# Patient Record
Sex: FEMALE | Race: WHITE
[De-identification: names, ages, dates, MRNs, and addresses within clinical notes are randomized per-mention and may not be internally consistent; named-entity substitution may affect disease eponyms.]

---

## 2019-12-06 ENCOUNTER — HOSPITAL ENCOUNTER (OUTPATIENT)
Dept: HOSPITAL 92 - SCSMRI | Age: 54
Discharge: HOME | End: 2019-12-06
Attending: INTERNAL MEDICINE
Payer: COMMERCIAL

## 2019-12-06 DIAGNOSIS — R10.9: Primary | ICD-10-CM

## 2019-12-06 DIAGNOSIS — R94.5: ICD-10-CM

## 2019-12-06 DIAGNOSIS — D18.03: ICD-10-CM

## 2019-12-06 PROCEDURE — 74183 MRI ABD W/O CNTR FLWD CNTR: CPT

## 2019-12-06 NOTE — MRI
EXAM: MRI of the abdomen without and with contrast



COMPARISON: None



HISTORY: Abnormal LFTs and right-sided abdominal pain



TECHNIQUE: Multiplanar multi sequence MR images were taken of the abdomen without and with IV contras
t. [An MRCP was performed.]



FINDINGS:

Liver: No intrahepatic ductal dilatation. Small 1.2 cm focus of high T2 signal in the left lobe of th
e liver demonstrates peripheral puddling and centripetal filling consistent with a small

hemangioma. Normal signal without dropout on out of phase images.  No abnormal enhancement.



Gallbladder: A small 2 to 3 mm filling defect in the dependent aspect of the gallbladder may resent a
 small gallstone.



Common bile duct: Normal caliber without filling defects



Adrenal glands: Unremarkable.



Kidneys: No hydronephrosis or focal renal lesions. No abnormal areas of enhancement.



Spleen: Unremarkable.



Pancreas: Unremarkable. No abnormal enhancement.



Retroperitoneum: No enlarged lymph nodes



Bones: No marrow signal abnormality.



IMPRESSION:

1. Possible small gallstone within the gallbladder.

2. Hepatic hemangioma



Reported By: Christian Marrero 

Electronically Signed:  12/6/2019 2:08 PM

## 2020-02-28 ENCOUNTER — HOSPITAL ENCOUNTER (INPATIENT)
Dept: HOSPITAL 92 - ERS | Age: 55
LOS: 3 days | Discharge: HOME | DRG: 417 | End: 2020-03-02
Attending: SURGERY | Admitting: SURGERY
Payer: COMMERCIAL

## 2020-02-28 VITALS — BODY MASS INDEX: 25.8 KG/M2

## 2020-02-28 DIAGNOSIS — E03.9: ICD-10-CM

## 2020-02-28 DIAGNOSIS — E78.5: ICD-10-CM

## 2020-02-28 DIAGNOSIS — K75.4: Primary | ICD-10-CM

## 2020-02-28 DIAGNOSIS — M06.9: ICD-10-CM

## 2020-02-28 DIAGNOSIS — K85.10: ICD-10-CM

## 2020-02-28 DIAGNOSIS — K80.10: ICD-10-CM

## 2020-02-28 DIAGNOSIS — K74.60: ICD-10-CM

## 2020-02-28 DIAGNOSIS — Z79.899: ICD-10-CM

## 2020-02-28 DIAGNOSIS — E66.9: ICD-10-CM

## 2020-02-28 DIAGNOSIS — Z79.890: ICD-10-CM

## 2020-02-28 DIAGNOSIS — Z90.710: ICD-10-CM

## 2020-02-28 LAB
ALBUMIN SERPL BCG-MCNC: 3.6 G/DL (ref 3.5–5)
ALP SERPL-CCNC: 465 U/L (ref 40–110)
ALT SERPL W P-5'-P-CCNC: 1407 U/L (ref 8–55)
ANION GAP SERPL CALC-SCNC: 12 MMOL/L (ref 10–20)
AST SERPL-CCNC: 1678 U/L (ref 5–34)
BASOPHILS # BLD AUTO: 0.1 THOU/UL (ref 0–0.2)
BASOPHILS NFR BLD AUTO: 1.4 % (ref 0–1)
BILIRUB SERPL-MCNC: 1.9 MG/DL (ref 0.2–1.2)
BUN SERPL-MCNC: 8 MG/DL (ref 9.8–20.1)
CALCIUM SERPL-MCNC: 9.1 MG/DL (ref 7.8–10.44)
CHLORIDE SERPL-SCNC: 108 MMOL/L (ref 98–107)
CO2 SERPL-SCNC: 22 MMOL/L (ref 22–29)
CREAT CL PREDICTED SERPL C-G-VRATE: 0 ML/MIN (ref 70–130)
EOSINOPHIL # BLD AUTO: 0.2 THOU/UL (ref 0–0.7)
EOSINOPHIL NFR BLD AUTO: 2.1 % (ref 0–10)
GLOBULIN SER CALC-MCNC: 4.4 G/DL (ref 2.4–3.5)
GLUCOSE SERPL-MCNC: 94 MG/DL (ref 70–105)
HGB BLD-MCNC: 13.3 G/DL (ref 12–16)
LEUKOCYTE ESTERASE UR QL STRIP.AUTO: 250 LEU/UL
LIPASE SERPL-CCNC: 750 U/L (ref 8–78)
LYMPHOCYTES # BLD: 2.3 THOU/UL (ref 1.2–3.4)
LYMPHOCYTES NFR BLD AUTO: 27.5 % (ref 21–51)
MCH RBC QN AUTO: 33 PG (ref 27–31)
MCV RBC AUTO: 98.1 FL (ref 78–98)
MONOCYTES # BLD AUTO: 1 THOU/UL (ref 0.11–0.59)
MONOCYTES NFR BLD AUTO: 12.3 % (ref 0–10)
NEUTROPHILS # BLD AUTO: 4.7 THOU/UL (ref 1.4–6.5)
NEUTROPHILS NFR BLD AUTO: 56.7 % (ref 42–75)
PLATELET # BLD AUTO: 200 THOU/UL (ref 130–400)
POTASSIUM SERPL-SCNC: 3.8 MMOL/L (ref 3.5–5.1)
PROT UR STRIP.AUTO-MCNC: 20 MG/DL
RBC # BLD AUTO: 4.02 MILL/UL (ref 4.2–5.4)
RBC UR QL AUTO: (no result) HPF (ref 0–3)
SODIUM SERPL-SCNC: 138 MMOL/L (ref 136–145)
WBC # BLD AUTO: 8.2 THOU/UL (ref 4.8–10.8)
WBC UR QL AUTO: (no result) HPF (ref 0–3)

## 2020-02-28 PROCEDURE — 74330 X-RAY BILE/PANC ENDOSCOPY: CPT

## 2020-02-28 PROCEDURE — 88307 TISSUE EXAM BY PATHOLOGIST: CPT

## 2020-02-28 PROCEDURE — 36415 COLL VENOUS BLD VENIPUNCTURE: CPT

## 2020-02-28 PROCEDURE — 81015 MICROSCOPIC EXAM OF URINE: CPT

## 2020-02-28 PROCEDURE — 83690 ASSAY OF LIPASE: CPT

## 2020-02-28 PROCEDURE — C9113 INJ PANTOPRAZOLE SODIUM, VIA: HCPCS

## 2020-02-28 PROCEDURE — 80076 HEPATIC FUNCTION PANEL: CPT

## 2020-02-28 PROCEDURE — 81003 URINALYSIS AUTO W/O SCOPE: CPT

## 2020-02-28 PROCEDURE — 96374 THER/PROPH/DIAG INJ IV PUSH: CPT

## 2020-02-28 PROCEDURE — 96361 HYDRATE IV INFUSION ADD-ON: CPT

## 2020-02-28 PROCEDURE — 96375 TX/PRO/DX INJ NEW DRUG ADDON: CPT

## 2020-02-28 PROCEDURE — 85025 COMPLETE CBC W/AUTO DIFF WBC: CPT

## 2020-02-28 PROCEDURE — 88313 SPECIAL STAINS GROUP 2: CPT

## 2020-02-28 PROCEDURE — 76705 ECHO EXAM OF ABDOMEN: CPT

## 2020-02-28 PROCEDURE — 88304 TISSUE EXAM BY PATHOLOGIST: CPT

## 2020-02-28 PROCEDURE — 80053 COMPREHEN METABOLIC PANEL: CPT

## 2020-02-28 PROCEDURE — S0020 INJECTION, BUPIVICAINE HYDRO: HCPCS

## 2020-02-28 PROCEDURE — 80048 BASIC METABOLIC PNL TOTAL CA: CPT

## 2020-02-28 PROCEDURE — 82150 ASSAY OF AMYLASE: CPT

## 2020-02-28 NOTE — ULT
EXAM:

Right upper quadrant ultrasound



PROVIDED CLINICAL HISTORY:

Abdominal pain



COMPARISON:

None



FINDINGS:

Visualized portions of the pancreas appear normal.



Liver demonstrates no mass or intrahepatic biliary ductal dilatation.



Common duct is nondilated. Gallbladder demonstrates gallstones, appearing decompressed. 



Right kidney demonstrates no hydronephrosis or mass.



IMPRESSION:

Decompressed gallbladder. Gallstones.



Reported By: Alvarez Aceves 

Electronically Signed:  2/28/2020 6:04 PM

## 2020-02-29 LAB
ALBUMIN SERPL BCG-MCNC: 3.1 G/DL (ref 3.5–5)
ALP SERPL-CCNC: 390 U/L (ref 40–110)
ALT SERPL W P-5'-P-CCNC: 1292 U/L (ref 8–55)
ANION GAP SERPL CALC-SCNC: 12 MMOL/L (ref 10–20)
AST SERPL-CCNC: 1751 U/L (ref 5–34)
BASOPHILS # BLD AUTO: 0.1 THOU/UL (ref 0–0.2)
BASOPHILS NFR BLD AUTO: 1.3 % (ref 0–1)
BILIRUB DIRECT SERPL-MCNC: 1.5 MG/DL (ref 0.1–0.3)
BILIRUB SERPL-MCNC: 2.2 MG/DL (ref 0.2–1.2)
BUN SERPL-MCNC: 9 MG/DL (ref 9.8–20.1)
CALCIUM SERPL-MCNC: 8.7 MG/DL (ref 7.8–10.44)
CHLORIDE SERPL-SCNC: 107 MMOL/L (ref 98–107)
CO2 SERPL-SCNC: 22 MMOL/L (ref 22–29)
CREAT CL PREDICTED SERPL C-G-VRATE: 107 ML/MIN (ref 70–130)
EOSINOPHIL # BLD AUTO: 0.2 THOU/UL (ref 0–0.7)
EOSINOPHIL NFR BLD AUTO: 2.1 % (ref 0–10)
GLUCOSE SERPL-MCNC: 75 MG/DL (ref 70–105)
HGB BLD-MCNC: 12.2 G/DL (ref 12–16)
LIPASE SERPL-CCNC: 346 U/L (ref 8–78)
LYMPHOCYTES # BLD: 1.8 THOU/UL (ref 1.2–3.4)
LYMPHOCYTES NFR BLD AUTO: 23.2 % (ref 21–51)
MCH RBC QN AUTO: 32.8 PG (ref 27–31)
MCV RBC AUTO: 98.6 FL (ref 78–98)
MONOCYTES # BLD AUTO: 1 THOU/UL (ref 0.11–0.59)
MONOCYTES NFR BLD AUTO: 12.6 % (ref 0–10)
NEUTROPHILS # BLD AUTO: 4.8 THOU/UL (ref 1.4–6.5)
NEUTROPHILS NFR BLD AUTO: 60.7 % (ref 42–75)
PLATELET # BLD AUTO: 183 THOU/UL (ref 130–400)
POTASSIUM SERPL-SCNC: 4.3 MMOL/L (ref 3.5–5.1)
RBC # BLD AUTO: 3.72 MILL/UL (ref 4.2–5.4)
SODIUM SERPL-SCNC: 137 MMOL/L (ref 136–145)
WBC # BLD AUTO: 7.9 THOU/UL (ref 4.8–10.8)

## 2020-02-29 PROCEDURE — 0F798ZZ DILATION OF COMMON BILE DUCT, VIA NATURAL OR ARTIFICIAL OPENING ENDOSCOPIC: ICD-10-PCS | Performed by: INTERNAL MEDICINE

## 2020-02-29 PROCEDURE — BF10YZZ FLUOROSCOPY OF BILE DUCTS USING OTHER CONTRAST: ICD-10-PCS | Performed by: INTERNAL MEDICINE

## 2020-02-29 RX ADMIN — Medication SCH: at 19:20

## 2020-02-29 RX ADMIN — POTASSIUM CHLORIDE, DEXTROSE MONOHYDRATE AND SODIUM CHLORIDE SCH MLS: 150; 5; 450 INJECTION, SOLUTION INTRAVENOUS at 13:52

## 2020-02-29 RX ADMIN — POTASSIUM CHLORIDE, DEXTROSE MONOHYDRATE AND SODIUM CHLORIDE SCH MLS: 150; 5; 450 INJECTION, SOLUTION INTRAVENOUS at 18:39

## 2020-02-29 NOTE — HP
CHIEF COMPLAINT:  Mid epigastric and right upper quadrant pain, nausea.



HISTORY OF PRESENT ILLNESS:  This is a 54-year-old female, who developed upper

abdominal pain and nausea in October, so they did an ultrasound that showed a

probable polyp in the gallbladder, but her LFTs were bumped.  So, they did an MRCP,

it showed a small filling defect in the dependent gallbladder, possible stone.  She

was doing fine until yesterday when she developed recurrence of the pain, but it was

worse.  She went to the GI clinic, where they repeated the LFTs and they were

bumped, so she was sent to the ER.  An ultrasound was performed that showed this

time definite gallstones. 



PAST MEDICAL HISTORY:  Obesity, hyperlipidemia, and hypothyroidism.



PAST SURGICAL HISTORY:  She had a hysterectomy 10 years ago and arm surgery.



MEDICATIONS:  

1. Atorvastatin.

2. Levothyroxine.



ALLERGIES:  NO KNOWN DRUG ALLERGIES.



SOCIAL HISTORY:  She is , unemployed.  No tobacco or alcohol.



FAMILY HISTORY:  Noncontributory.



PHYSICAL EXAMINATION:

VITAL SIGNS:  Temperature 98.4, pulse 74, and blood pressure 106/67. 

GENERAL:  She is awake and alert, in no apparent distress.  She says her pain is

better. 

HEENT:  No jaundice. 

LUNGS:  Clear. 

HEART:  Regular rate and rhythm. 

ABDOMEN:  Obese, soft, mildly tender in the right upper quadrant.  No palpable mass. 

EXTREMITIES:  Unremarkable.



LABORATORY DATA:  Her white count is 8.2, H and H are 13 and 39, and platelet count

200.  Her electrolytes are fine, but her bilirubin is 1.9, AST of 1678, ALT of 1407,

alkaline phosphatase of 465, and lipase is 750. 



ASSESSMENT:  Possible biliary pancreatitis, but she is improved.



PLAN:  GI consult.  We will repeat their LFTs.  If they are getting better, would

recommend lap khanh with cholangiogram.  If they are worse, I would recommend ERCP. 







Job ID:  [id number]

## 2020-02-29 NOTE — CON
DATE OF CONSULTATION:  02/29/2020



CHIEF COMPLAINT:  Abdominal pain and nausea.



HISTORY OF PRESENT ILLNESS:  Ms. Delgado is a 54-year-old woman who developed nausea

and a dull aching right upper quadrant constant abdominal pain back in October 2019.

 Her liver tests were completely normal in October 2018; however, with the onset of

this abdominal pain back in October 2019, her ALT was up in the 1400 range with an

AST of 958 and alkaline phosphatase of 288 and a normal bilirubin.  She saw Dr. Alvarez

in GI Clinic.  MRCP was obtained on 12/06/2019.  The MRCP showed a hemangioma in the

liver.  A small 2 to 3 mm gallstone was noted in the gallbladder.  The common bile

duct was nondilated without obvious filling defects.  Her liver tests when tested in

the office in October had dropped down to normal alkaline phosphatase with an AST of

53 and ALT of 62 and a bilirubin of 0.3.  Her pain then went away, she had no

further nausea, no pain for about 2 months after that and then a week ago, she had

recurrence of the pain.  At this time, the pain was more severe than with previous

episodes.  She came into the office yesterday and was sent onto the emergency room

for further care.  Her labs were drawn and at this time, her AST was 1700, her ALT

was 1400, and her alkaline phosphatase was 466 with an elevated bilirubin at 2.0.

She had an ultrasound performed in the emergency room that again showed gallstones

in the gallbladder, but a nondilated bile duct.  She has also had bloating and

aching epigastric abdominal pain and her lipase was noted to be elevated at 750 with

an upper limit of normal at this lab of 78.  She was given IV fluids and her pain is

doing better today.  She still has some nausea and discomfort.  She has had no

diarrhea, constipation, or blood in the stool. 



PAST MEDICAL HISTORY:  Includes rheumatoid arthritis.  She was treated with the

Remicade in the past, but has not been on it for least 12 years.  She has

hyperlipidemia, has been on atorvastatin for that.  She has been on the same

medicine for at least a year and a half without any recent changes.  She also has

hypothyroidism. 



PAST SURGICAL HISTORY:  Hysterectomy and arm surgery.



FAMILY HISTORY:  Negative for GI malignancy.



SOCIAL HISTORY:  No alcohol, tobacco, or drugs.



ALLERGIES:  NO KNOWN DRUG ALLERGIES.



MEDICATIONS:  At home prior to admission include:

1. Atorvastatin.

2. Levothyroxine. 

She has not taken any acetaminophen or NSAIDs.



REVIEW OF SYSTEMS:  Negative x10 systems reviewed, except as stated in history of

present illness.  No fever. 



PHYSICAL EXAMINATION:

VITAL SIGNS:  Temperature 98.4, pulse 74, and blood pressure 106/67. 

GENERAL:  She is in no acute distress.  Alert and oriented x3. 

HEENT:  Eyes have no scleral icterus.  Oropharynx is clear without lesions.  No

cervical or supraclavicular lymphadenopathy. 

LUNGS:  Clear to auscultation bilaterally. 

HEART:  Regular rate and rhythm without murmur. 

ABDOMEN:  Soft.  Mild tenderness in the right upper quadrant without guarding.

Bowel sounds are present. 

EXTREMITIES:  No lower extremity edema. 

NEUROLOGIC:  Cranial nerves are grossly intact.



LABORATORY DATA:  White blood cell count 7.9, hemoglobin 12.2, platelets 183.

Creatinine 0.72, calcium 8.7.  Bilirubin 2.2, AST 1751, ALT 1292, alkaline

phosphatase 390, lipase is 346 down from 750 yesterday.  She did have a smooth

muscle antibody that was moderately elevated at 34 on 10/17/2019.  Viral hepatitis

screen was negative at that time.  Her globulin yesterday was elevated at 4.4.

Today, her albumin is 7.0 with a total protein of 3.1 with a globulin of 3.9. 



IMPRESSION:  

1. Gallstone pancreatitis.  She has small stones measuring 2 to 3 mm in the

gallbladder along with acute pancreatitis and acute elevation of her liver tests.

Her bile duct is not dilated, however only small stones were seen.  This would

explain both the elevated liver tests and the acute pancreatitis.  There is no

alcohol history. 

2. Abnormal liver function tests.  The marked elevation of the transaminases would

be more consistent with an alternative etiology.  Autoimmune hepatitis could explain

this.  Her smooth muscle antibody was elevated in October.  Her globulin was

elevated.  She has a history of rheumatoid arthritis, which has been untreated.

Liver biopsy would be helpful in confirming this diagnosis and initiating treatment

if indicated.  If she ultimately requires a cholecystectomy for gallstones, then

liver biopsy could be performed at that time.  One factor that might be more

suggestive of a biliary stone than the autoimmune hepatitis might be that her

transaminases abruptly dropped from 1000 to 50s and 60s range with temporary

resolution of her abdominal pain.  Autoimmune hepatitis I would expect to be more

persistent.  With the bilirubin increasing, I think the index of suspicion is high

enough for stone along with the pancreatitis that we should proceed with ERCP at

this point.  Repeat MRI might miss a 2 mm stone as was noted on previous imaging. 



RECOMMENDATIONS:  

1. ERCP with sphincterotomy today.

2. If she does ultimately require cholecystectomy following that, then liver biopsy

could be done at the same time. 

3. I discussed the risks and benefits of ERCP in detail with Ms. Delgado.  This

includes bleeding, infection, perforation, pancreatitis, and failure to cannulate. 







Job ID:  [id number]

## 2020-02-29 NOTE — OP
DATE OF PROCEDURE:  02/29/2020



PROCEDURE PERFORMED:  Endoscopic retrograde cholangiopancreatography with

sphincterotomy. 



PREOPERATIVE DIAGNOSES:  Gallstone pancreatitis and increasing bilirubin.



DESCRIPTION OF PROCEDURE:  Informed consent was obtained from the patient.  She 
was

sedated with general anesthesia and placed in the prone position.  The 
duodenoscope

was advanced easily to the second portion of the duodenum, where the ampulla was

identified and appeared unremarkable.  The common bile duct was selectively

cannulated easily on first attempt with the guidewire.  The sphincterotome was

advanced into the bile duct and cholangiogram revealed a 3 mm smooth common bile

duct without filling defects.  The intra and extrahepatic ducts were normal.  
The

gallbladder filled and cholelithiasis was seen.  A complete sphincterotomy was

performed.  Balloon sweep of the bile duct with a partially filled 9 mm balloon 
was

performed.  The bile duct was confirmed to be clear.  Occlusion cholangiogram 
was

clear.  The balloon passed easily through the sphincterotomy without any 
resistance,

and fluid was suctioned from the stomach, and the procedure was completed. 



IMPRESSION:  

1. Cholangiogram shows a 3 mm common bile duct without obvious filling defects.

There were normal intra and extrahepatic ducts.  The gallbladder did fill 
showing

cholelithiasis. 

2. Complete sphincterotomy performed with good drainage of clear yellow bile.

3. Balloon sweep of the bile duct and occlusion cholangiogram confirmed the 
duct to

be clear.  The balloon passed easily through the sphincterotomy without any

resistance. 



RECOMMENDATIONS:  

1. Recheck liver tests in the morning.

2. Plan is for laparoscopic cholecystectomy and liver biopsy tomorrow.

3. I suspect she has autoimmune hepatitis is a primary reason for the elevated 
liver

tests.  This could be in addition to gallstone pancreatitis, however. 





Job ID:  [id number]



[facility]

## 2020-03-01 LAB
ALBUMIN SERPL BCG-MCNC: 3 G/DL (ref 3.5–5)
ALP SERPL-CCNC: 364 U/L (ref 40–110)
ALT SERPL W P-5'-P-CCNC: 1091 U/L (ref 8–55)
AST SERPL-CCNC: 1163 U/L (ref 5–34)
BASOPHILS # BLD AUTO: 0 THOU/UL (ref 0–0.2)
BASOPHILS NFR BLD AUTO: 0.1 % (ref 0–1)
BILIRUB DIRECT SERPL-MCNC: 0.9 MG/DL (ref 0.1–0.3)
BILIRUB SERPL-MCNC: 1.3 MG/DL (ref 0.2–1.2)
EOSINOPHIL # BLD AUTO: 0 THOU/UL (ref 0–0.7)
EOSINOPHIL NFR BLD AUTO: 0.1 % (ref 0–10)
HGB BLD-MCNC: 11.4 G/DL (ref 12–16)
LIPASE SERPL-CCNC: 99 U/L (ref 8–78)
LYMPHOCYTES # BLD: 1.3 THOU/UL (ref 1.2–3.4)
LYMPHOCYTES NFR BLD AUTO: 15.6 % (ref 21–51)
MCH RBC QN AUTO: 32.5 PG (ref 27–31)
MCV RBC AUTO: 98.2 FL (ref 78–98)
MONOCYTES # BLD AUTO: 0.5 THOU/UL (ref 0.11–0.59)
MONOCYTES NFR BLD AUTO: 5.8 % (ref 0–10)
NEUTROPHILS # BLD AUTO: 6.3 THOU/UL (ref 1.4–6.5)
NEUTROPHILS NFR BLD AUTO: 78.4 % (ref 42–75)
PLATELET # BLD AUTO: 186 THOU/UL (ref 130–400)
RBC # BLD AUTO: 3.5 MILL/UL (ref 4.2–5.4)
WBC # BLD AUTO: 8.1 THOU/UL (ref 4.8–10.8)

## 2020-03-01 PROCEDURE — 0FB04ZX EXCISION OF LIVER, PERCUTANEOUS ENDOSCOPIC APPROACH, DIAGNOSTIC: ICD-10-PCS | Performed by: SURGERY

## 2020-03-01 PROCEDURE — 0FT44ZZ RESECTION OF GALLBLADDER, PERCUTANEOUS ENDOSCOPIC APPROACH: ICD-10-PCS | Performed by: SURGERY

## 2020-03-01 RX ADMIN — Medication SCH ML: at 12:06

## 2020-03-01 RX ADMIN — CEFOXITIN SODIUM SCH MLS: 2 INJECTION, SOLUTION INTRAVENOUS at 13:57

## 2020-03-01 RX ADMIN — POTASSIUM CHLORIDE, DEXTROSE MONOHYDRATE AND SODIUM CHLORIDE SCH: 150; 5; 450 INJECTION, SOLUTION INTRAVENOUS at 04:34

## 2020-03-01 RX ADMIN — POTASSIUM CHLORIDE, DEXTROSE MONOHYDRATE AND SODIUM CHLORIDE SCH MLS: 150; 5; 450 INJECTION, SOLUTION INTRAVENOUS at 04:58

## 2020-03-01 RX ADMIN — FAMOTIDINE SCH: 10 INJECTION, SOLUTION INTRAVENOUS at 20:19

## 2020-03-01 RX ADMIN — Medication SCH ML: at 20:16

## 2020-03-01 RX ADMIN — CEFOXITIN SODIUM SCH MLS: 2 INJECTION, SOLUTION INTRAVENOUS at 21:55

## 2020-03-01 RX ADMIN — POTASSIUM CHLORIDE, DEXTROSE MONOHYDRATE AND SODIUM CHLORIDE SCH: 150; 5; 450 INJECTION, SOLUTION INTRAVENOUS at 16:12

## 2020-03-01 NOTE — PRG
DATE OF SERVICE:  03/01/2020



SUBJECTIVE:  Ms. Delgado underwent cholecystectomy today.  She tolerated the procedure

well and she had a solid diet this evening, which she is also tolerating well.  She

has some bloating.  No nausea or vomiting. 



OBJECTIVE:  VITAL SIGNS:  Temperature is 98.0, pulse 53, and blood pressure 112/69. 

GENERAL:  She is in no acute distress.  Alert and oriented x3. 

HEENT:  Eyes have no scleral icterus.  Oropharynx is clear without lesions. 

NECK:  No cervical or supraclavicular lymphadenopathy. 

LUNGS:  Clear to auscultation bilaterally. 

HEART:  Regular rate and rhythm without murmur. 

ABDOMEN:  Soft, nontender, except around the surgical sites.  Bowel sounds are

present.  No guarding. 

EXTREMITIES:  No lower extremity edema.



LABORATORY DATA:  Hemoglobin 11.4, platelets 186, and white blood cell count 8.1.

Bilirubin 1.3, AST 1163, ALT 1091, alkaline phosphatase 364, albumin 3.0, and lipase

is down to 99. 



IMPRESSION:  

1. Suspected gallstone pancreatitis, status post sphincterotomy.  Her liver tests

are trending down today. 

2. Cholelithiasis, status post laparoscopic cholecystectomy today.

3. Cirrhosis of the liver.  Her liver was confirmed to be nodular visibly at the

time of the cholecystectomy.  Liver biopsy was performed.  I suspect she has

autoimmune hepatitis given the elevated globulin, transaminases over 1000, duration

of __________ intermittently high transaminases, and elevated smooth muscle antibody

previously.  She has a history of rheumatoid arthritis. 



RECOMMENDATIONS:  

1. We will wait for the liver biopsy results.  Once those are back and if autoimmune

hepatitis is confirmed, then she can start treatment with prednisone. 

2. Check alpha fetoprotein, hepatitis A total antibody, and hepatitis B surface

antibody to establish if she needs to be vaccinated for hepatitis and hepatitis B

and for hepatoma screening. 

3. Anticipate discharge home tomorrow.  She will call the office for followup in our

office within the next couple of weeks. 







Job ID:  [id number]

## 2020-03-02 VITALS — DIASTOLIC BLOOD PRESSURE: 72 MMHG | SYSTOLIC BLOOD PRESSURE: 115 MMHG

## 2020-03-02 VITALS — TEMPERATURE: 98 F

## 2020-03-02 LAB
ALBUMIN SERPL BCG-MCNC: 2.7 G/DL (ref 3.5–5)
ALP SERPL-CCNC: 309 U/L (ref 40–110)
ALT SERPL W P-5'-P-CCNC: 787 U/L (ref 8–55)
ANION GAP SERPL CALC-SCNC: 9 MMOL/L (ref 10–20)
AST SERPL-CCNC: 684 U/L (ref 5–34)
BASOPHILS # BLD AUTO: 0 THOU/UL (ref 0–0.2)
BASOPHILS NFR BLD AUTO: 0.2 % (ref 0–1)
BILIRUB SERPL-MCNC: 1 MG/DL (ref 0.2–1.2)
BUN SERPL-MCNC: 10 MG/DL (ref 9.8–20.1)
CALCIUM SERPL-MCNC: 8.3 MG/DL (ref 7.8–10.44)
CHLORIDE SERPL-SCNC: 110 MMOL/L (ref 98–107)
CO2 SERPL-SCNC: 21 MMOL/L (ref 22–29)
CREAT CL PREDICTED SERPL C-G-VRATE: 104 ML/MIN (ref 70–130)
EOSINOPHIL # BLD AUTO: 0 THOU/UL (ref 0–0.7)
EOSINOPHIL NFR BLD AUTO: 0.1 % (ref 0–10)
GLOBULIN SER CALC-MCNC: 3.7 G/DL (ref 2.4–3.5)
GLUCOSE SERPL-MCNC: 99 MG/DL (ref 70–105)
HGB BLD-MCNC: 11 G/DL (ref 12–16)
LIPASE SERPL-CCNC: 68 U/L (ref 8–78)
LYMPHOCYTES # BLD: 2 THOU/UL (ref 1.2–3.4)
LYMPHOCYTES NFR BLD AUTO: 12.7 % (ref 21–51)
MCH RBC QN AUTO: 33.2 PG (ref 27–31)
MCV RBC AUTO: 99.8 FL (ref 78–98)
MONOCYTES # BLD AUTO: 1 THOU/UL (ref 0.11–0.59)
MONOCYTES NFR BLD AUTO: 6.2 % (ref 0–10)
NEUTROPHILS # BLD AUTO: 12.6 THOU/UL (ref 1.4–6.5)
NEUTROPHILS NFR BLD AUTO: 80.7 % (ref 42–75)
PLATELET # BLD AUTO: 207 THOU/UL (ref 130–400)
POTASSIUM SERPL-SCNC: 4.6 MMOL/L (ref 3.5–5.1)
RBC # BLD AUTO: 3.31 MILL/UL (ref 4.2–5.4)
SODIUM SERPL-SCNC: 135 MMOL/L (ref 136–145)
WBC # BLD AUTO: 15.6 THOU/UL (ref 4.8–10.8)

## 2020-03-02 RX ADMIN — POTASSIUM CHLORIDE, DEXTROSE MONOHYDRATE AND SODIUM CHLORIDE SCH: 150; 5; 450 INJECTION, SOLUTION INTRAVENOUS at 09:24

## 2020-03-02 RX ADMIN — CEFOXITIN SODIUM SCH MLS: 2 INJECTION, SOLUTION INTRAVENOUS at 05:22

## 2020-03-02 RX ADMIN — FAMOTIDINE SCH: 10 INJECTION, SOLUTION INTRAVENOUS at 09:23

## 2020-03-02 RX ADMIN — Medication SCH ML: at 09:24

## 2020-03-02 RX ADMIN — POTASSIUM CHLORIDE, DEXTROSE MONOHYDRATE AND SODIUM CHLORIDE SCH: 150; 5; 450 INJECTION, SOLUTION INTRAVENOUS at 03:38

## 2020-03-02 NOTE — DIS
DATE OF ADMISSION:  02/28/2020



DATE OF DISCHARGE:  03/02/2020



DISCHARGE DIAGNOSIS:  

1. Biliary pancreatitis.

2. Autoimmune hepatitis.



PROCEDURES DURING ADMISSION:  Endoscopic retrograde cholangiopancreatography with

sphincterotomy, laparoscopic cholecystectomy with liver biopsy. 



HOSPITAL COURSE:  The patient was admitted, given IV fluids, IV hydration, n.p.o.

status.  GI was consulted.  Her LFTs went up, so she was taken to ERCP.  They did

not definitely see any kind of filling defects.  Postprocedure, she did fine.  She

was taken to the operating room the next day for laparoscopic cholecystectomy and

liver biopsy.  Postoperatively, she is doing well.  She is tolerating her diet.  She

is passing gas.  She is discharged home in good condition on hydrocodone and Zofran. 



She will follow up with me in 2 weeks.







Job ID:  [id number]

## 2020-03-02 NOTE — OP
DATE OF PROCEDURE:  03/01/2020



PREOPERATIVE DIAGNOSIS:  Biliary pancreatitis with possible hepatitis.



PROCEDURE PERFORMED:  Laparoscopic cholecystectomy with wedge liver biopsy.



INDICATIONS:  The patient is a 54-year-old female, who has been having right upper

quadrant pain with elevated liver function tests under evaluation by the GI doctors.

 She came in through the emergency room with an exacerbation of pain and elevation

of lipase, consistent with biliary pancreatitis.  Ultrasound showed gallstones.  She

underwent ERCP yesterday.  They did not see any stones in the duct. 



FINDINGS:  Nodular liver, small caliber cystic duct.



DESCRIPTION OF PROCEDURE:  After informed consent was obtained, the patient was

taken to the operating room and given general endotracheal anesthesia, placed in

supine position.  Abdomen was prepped and draped in usual fashion. Local anesthesia

infiltrated subcutaneously and deep. A subumbilical incision was performed, subcu

divided sharply. The fascia was grasped.  Two stay sutures of 0 Vicryl placed on

either side of midline.  Midline incised, digital palpation revealed no local

adhesions.  A blunt 12 mm trocar inserted.  Pneumoperitoneum was created to a

pressure of 15 mmHg. 0-degree laparoscope inserted under direct vision.  Three 5 mm

ports were placed subcostally. The gallbladder was grasped and advanced superiorly.

The peritoneum was opened and the cystic duct, artery, and critical view were

exposed.  The cystic duct and artery were triply ligated with hemoclips and divided.

The gallbladder removed from its fossa utilizing electrocautery, removed from the

abdomen through the umbilical port.  Hemostasis was achieved with electrocautery.

Using Metzenbaum scissors, a wedge was taken off the right mid inferior liver edge.

This was sent fresh to Pathology.  Hemostasis was achieved with electrocautery.  The

abdomen was irrigated. Irrigation fluid removed.  Hemostasis was assured.  Trocars

and retractors were removed.  Fascia closed with interrupted 0 Vicryl suture.  The

skin closed with interrupted 4-0 Rapide. Dermabond applied.  The patient tolerated

the procedure well, transferred to Recovery in good condition.  Sponge and needle

count verified correct x2. 





Job ID:  [id number]

## 2020-03-02 NOTE — RAD
XR ERCP



History: Stones



Comparison: Gallbladder ultrasound prior day



Findings: Multiple spot images were obtained from the procedure room. There is contrast filling the c
ommon bile duct cystic duct and gallbladder.



Impression: Fluoroscopy for procedural purposes.



Reported By: Ba Sexton 

Electronically Signed:  2/29/2020 12:48 PM